# Patient Record
Sex: FEMALE | Race: WHITE | URBAN - METROPOLITAN AREA
[De-identification: names, ages, dates, MRNs, and addresses within clinical notes are randomized per-mention and may not be internally consistent; named-entity substitution may affect disease eponyms.]

---

## 2021-10-14 ENCOUNTER — OFFICE VISIT (OUTPATIENT)
Dept: PRIMARY CARE CLINIC | Age: 21
End: 2021-10-14
Payer: COMMERCIAL

## 2021-10-14 VITALS
OXYGEN SATURATION: 97 % | HEIGHT: 64 IN | BODY MASS INDEX: 27.28 KG/M2 | HEART RATE: 83 BPM | DIASTOLIC BLOOD PRESSURE: 87 MMHG | SYSTOLIC BLOOD PRESSURE: 138 MMHG | WEIGHT: 159.8 LBS

## 2021-10-14 DIAGNOSIS — Z00.00 ANNUAL PHYSICAL EXAM: Primary | ICD-10-CM

## 2021-10-14 DIAGNOSIS — J01.40 ACUTE NON-RECURRENT PANSINUSITIS: ICD-10-CM

## 2021-10-14 DIAGNOSIS — F33.9 RECURRENT MAJOR DEPRESSIVE DISORDER, REMISSION STATUS UNSPECIFIED (HCC): ICD-10-CM

## 2021-10-14 PROBLEM — F32.9 MAJOR DEPRESSIVE DISORDER, SINGLE EPISODE, UNSPECIFIED: Status: ACTIVE | Noted: 2020-07-20

## 2021-10-14 PROBLEM — E61.1 IRON DEFICIENCY: Status: ACTIVE | Noted: 2018-11-27

## 2021-10-14 PROCEDURE — 99204 OFFICE O/P NEW MOD 45 MIN: CPT | Performed by: STUDENT IN AN ORGANIZED HEALTH CARE EDUCATION/TRAINING PROGRAM

## 2021-10-14 PROCEDURE — 99385 PREV VISIT NEW AGE 18-39: CPT | Performed by: STUDENT IN AN ORGANIZED HEALTH CARE EDUCATION/TRAINING PROGRAM

## 2021-10-14 RX ORDER — AMOXICILLIN AND CLAVULANATE POTASSIUM 875; 125 MG/1; MG/1
1 TABLET, FILM COATED ORAL 2 TIMES DAILY
Qty: 14 TABLET | Refills: 0 | Status: SHIPPED | OUTPATIENT
Start: 2021-10-14 | End: 2021-10-21

## 2021-10-14 RX ORDER — BUPROPION HYDROCHLORIDE 300 MG/1
300 TABLET ORAL EVERY MORNING
Qty: 30 TABLET | Refills: 5 | Status: SHIPPED | OUTPATIENT
Start: 2021-10-14 | End: 2022-05-10 | Stop reason: SDUPTHER

## 2021-10-14 SDOH — ECONOMIC STABILITY: FOOD INSECURITY: WITHIN THE PAST 12 MONTHS, THE FOOD YOU BOUGHT JUST DIDN'T LAST AND YOU DIDN'T HAVE MONEY TO GET MORE.: NEVER TRUE

## 2021-10-14 SDOH — ECONOMIC STABILITY: FOOD INSECURITY: WITHIN THE PAST 12 MONTHS, YOU WORRIED THAT YOUR FOOD WOULD RUN OUT BEFORE YOU GOT MONEY TO BUY MORE.: NEVER TRUE

## 2021-10-14 ASSESSMENT — COLUMBIA-SUICIDE SEVERITY RATING SCALE - C-SSRS
2. HAVE YOU ACTUALLY HAD ANY THOUGHTS OF KILLING YOURSELF?: NO
6. HAVE YOU EVER DONE ANYTHING, STARTED TO DO ANYTHING, OR PREPARED TO DO ANYTHING TO END YOUR LIFE?: NO
1. WITHIN THE PAST MONTH, HAVE YOU WISHED YOU WERE DEAD OR WISHED YOU COULD GO TO SLEEP AND NOT WAKE UP?: YES

## 2021-10-14 ASSESSMENT — PATIENT HEALTH QUESTIONNAIRE - PHQ9
8. MOVING OR SPEAKING SO SLOWLY THAT OTHER PEOPLE COULD HAVE NOTICED. OR THE OPPOSITE, BEING SO FIGETY OR RESTLESS THAT YOU HAVE BEEN MOVING AROUND A LOT MORE THAN USUAL: 0
SUM OF ALL RESPONSES TO PHQ QUESTIONS 1-9: 18
3. TROUBLE FALLING OR STAYING ASLEEP: 3
2. FEELING DOWN, DEPRESSED OR HOPELESS: 3
SUM OF ALL RESPONSES TO PHQ QUESTIONS 1-9: 19
7. TROUBLE CONCENTRATING ON THINGS, SUCH AS READING THE NEWSPAPER OR WATCHING TELEVISION: 3
6. FEELING BAD ABOUT YOURSELF - OR THAT YOU ARE A FAILURE OR HAVE LET YOURSELF OR YOUR FAMILY DOWN: 3
10. IF YOU CHECKED OFF ANY PROBLEMS, HOW DIFFICULT HAVE THESE PROBLEMS MADE IT FOR YOU TO DO YOUR WORK, TAKE CARE OF THINGS AT HOME, OR GET ALONG WITH OTHER PEOPLE: 3
9. THOUGHTS THAT YOU WOULD BE BETTER OFF DEAD, OR OF HURTING YOURSELF: 1
SUM OF ALL RESPONSES TO PHQ QUESTIONS 1-9: 19
5. POOR APPETITE OR OVEREATING: 0
SUM OF ALL RESPONSES TO PHQ9 QUESTIONS 1 & 2: 6
4. FEELING TIRED OR HAVING LITTLE ENERGY: 3
1. LITTLE INTEREST OR PLEASURE IN DOING THINGS: 3

## 2021-10-14 ASSESSMENT — ENCOUNTER SYMPTOMS
RHINORRHEA: 1
NAUSEA: 0
SORE THROAT: 0
COUGH: 1
VOMITING: 0
SINUS PRESSURE: 0
SHORTNESS OF BREATH: 0
CONSTIPATION: 0
WHEEZING: 0
EYE PAIN: 0
DIARRHEA: 0

## 2021-10-14 ASSESSMENT — SOCIAL DETERMINANTS OF HEALTH (SDOH): HOW HARD IS IT FOR YOU TO PAY FOR THE VERY BASICS LIKE FOOD, HOUSING, MEDICAL CARE, AND HEATING?: NOT HARD AT ALL

## 2021-10-14 NOTE — PATIENT INSTRUCTIONS
Patient Education        Recovering From Depression: Care Instructions  Your Care Instructions     Taking good care of yourself is important as you recover from depression. In time, your symptoms will fade as your treatment takes hold. Do not give up. Instead, focus your energy on getting better. Your mood will improve. It just takes some time. Focus on things that can help you feel better, such as being with friends and family, eating well, and getting enough rest. But take things slowly. Do not do too much too soon. You will begin to feel better gradually. Follow-up care is a key part of your treatment and safety. Be sure to make and go to all appointments, and call your doctor if you are having problems. It's also a good idea to know your test results and keep a list of the medicines you take. How can you care for yourself at home? Be realistic  · If you have a large task to do, break it up into smaller steps you can handle, and just do what you can. · You may want to put off important decisions until your depression has lifted. If you have plans that will have a major impact on your life, such as marriage, divorce, or a job change, try to wait a bit. Talk it over with friends and loved ones who can help you look at the overall picture first.  · Reaching out to people for help is important. Do not isolate yourself. Let your family and friends help you. Find someone you can trust and confide in, and talk to that person. · Be patient, and be kind to yourself. Remember that depression is not your fault and is not something you can overcome with willpower alone. Treatment is important for depression, just like for any other illness. Feeling better takes time, and your mood will improve little by little. Stay active  · Stay busy and get outside. Take a walk, or try some other light exercise. · Talk with your doctor about an exercise program. Exercise can help with mild depression.   · Go to a movie or instruction, always ask your healthcare professional. [place] [address] any warranty or liability for your use of this information.

## 2021-10-14 NOTE — PROGRESS NOTES
[facility] Primary Care  Establish care visit   10/14/2021    Calista Gar (:  2000) is a 22 y.o. female, here to establish care. Chief Complaint   Patient presents with   [facility].    Annual Exam    Sinus Problem     Possible sinus infection, Head pressure and cough    Depression        ASSESSMENT/ PLAN  1. Annual physical exam  We will follow-up to complete blood work when she is feeling better. Would order lipid panel, CBC. Due for tetanus and flu vaccine. States that she did complete the HPV vaccine previously. She has been Vaccinated against Covid. 2. Acute non-recurrent pansinusitis  Start Augmentin, may need daily allergy medication if persistent  - amoxicillin-clavulanate (AUGMENTIN) 875-125 MG per tablet; Take 1 tablet by mouth 2 times daily for 7 days  Dispense: 14 tablet; Refill: 0    3. Recurrent major depressive disorder, [facility] status unspecified [facility]  Uncontrolled. Reinitiate Wellbutrin, will send in 150 mg tab if 300 mg is too much. Patient to schedule follow-up appointment with Dr. Musa Sifuentes for counseling and return to clinic in 2 weeks for medication management. Start exercise plan. Continue healthy diet. - buPROPion (WELLBUTRIN XL) 300 MG extended release tablet; Take 1 tablet by mouth every morning  Dispense: 30 tablet; Refill: 5       Return in about 2 weeks (around 10/28/2021) for follow up with Dr. Musa Sifuentes, medication management. HPI  Patient presents today to establish care. She has concerns about sinus congestion and depression. She states that she has been dealing with runny nose, sinus pressure, cough for the past 2 weeks. Covid test was negative. She is fully vaccinated. She denies fever, chills. She does not have allergies. She has not taken any medications for the symptoms. She has struggled with depression for many years. Previously well controlled on Wellbutrin.   She knows that she had tried an SSRI in the past and it did not work for her, however she cannot recall which one. She discontinued the Wellbutrin 3 months ago because she was lost to follow-up with her previous PCP. She is from [facility], and currently lives in [place] while she attends . She endorses passive SI, no active SI or plan. She would like to start counseling. Protective factors include her close relationships with her 4 siblings and her boyfriend. She endorses lack of motivation to get moving in the morning, to exercise and depressive symptoms are exacerbated by doing poorly in school. She is studying political science. She states it has been difficult to make up classwork as well in the setting of current sinusitis. She lives at home with roommates. She endorses a healthy diet and enjoys cooking. He does not currently exercise, but plans on starting walking routines. She is sleeping more than usual.  She endorses occasional alcohol use. She denies other drug use including marijuana. Family history is significant for lumpectomy in her sister; there was a concerning mass diagnosed when her sister was 25years old. Patient states that genetic testing was performed and did not show anything concerning. ROS  Review of Systems   Constitutional: Negative for activity change, appetite change, fatigue and fever. HENT: Positive for congestion and rhinorrhea. Negative for ear pain, sinus pressure and sore throat. Eyes: Negative for pain. Respiratory: Positive for cough. Negative for shortness of breath and wheezing. Cardiovascular: Negative for chest pain and leg swelling. Gastrointestinal: Negative for constipation, diarrhea, nausea and vomiting. Genitourinary: Negative for decreased urine volume and dysuria. Musculoskeletal: Negative for myalgias. Neurological: Negative for headaches. Psychiatric/Behavioral: Positive for sleep disturbance and suicidal ideas. The patient is not nervous/anxious.          HISTORIES  No current outpatient medications on file prior to visit. No current facility-administered medications on file prior to visit. No Known Allergies    Past Medical History:   Diagnosis Date    Depression        Patient Active Problem List   Diagnosis    Iron deficiency    Major depressive disorder, single episode, unspecified       History reviewed. No pertinent surgical history. Social History     Socioeconomic History    Marital status: Single     Spouse name: Not on file    Number of children: Not on file    Years of education: Not on file    Highest education level: Not on file   Occupational History    Occupation: Poly Sci student at [employer] [address] Use    Smoking status: Never Smoker    Smokeless tobacco: Never Used   Vaping Use    Vaping Use: Never used   Substance and Sexual Activity    Alcohol use: Yes     Comment: soc    Drug use: Never    Sexual activity: Yes     Partners: Male     Birth control/protection: I.U.D. Other Topics Concern    Not on file   Social History Narrative    Not on file     Social Determinants of Health     Financial Resource Strain: Low Risk     Difficulty of Paying Living Expenses: Not hard at all   Food Insecurity: No Food Insecurity    Worried About Running Out of Food in the Last Year: Never true    [address] in the Last Year: Never true   Transportation Needs:     Lack of Transportation (Medical):      Lack of Transportation (Non-Medical):    Physical Activity:     Days of Exercise per Week:     Minutes of Exercise per Session:    Stress:     Feeling of Stress :    Social Connections:     Frequency of Communication with Friends and Family:     Frequency of Social Gatherings with Friends and Family:     Attends Zoroastrian Services:     Active Member of Clubs or Organizations:     Attends Club or Organization Meetings:     Marital Status:    Intimate Partner Violence:     Fear of Current or Ex-Partner:     Emotionally Abused:     Physically Abused:     Sexually Abused:         Family History   Problem Relation Age of Onset    No Known Problems Mother     No Known Problems Father     Cancer Sister         breast lumpectomy       PE  Vitals:    10/14/21 0921   BP: 138/87   Site: Right Upper Arm   Position: Sitting   Cuff Size: Medium Adult   Pulse: 83   SpO2: 97%   Weight: 159 lb 12.8 oz (72.5 kg)   Height: 5' 4\" (1.626 m)     Estimated body mass index is 27.43 kg/m² as calculated from the following:    Height as of this encounter: 5' 4\" (1.626 m). Weight as of this encounter: 159 lb 12.8 oz (72.5 kg). Physical Exam  Vitals reviewed. Constitutional:       Appearance: Normal appearance. Comments: Tearful. HENT:      Head: Normocephalic and atraumatic. Right Ear: Tympanic membrane normal.      Left Ear: Tympanic membrane normal.      Nose: Congestion and rhinorrhea present. Comments: Sinus tenderness. Mouth/Throat:      Mouth: Mucous membranes are moist.      Pharynx: Oropharynx is clear. Eyes:      Conjunctiva/sclera: Conjunctivae normal.      Pupils: Pupils are equal, round, and reactive to light. Cardiovascular:      Rate and Rhythm: Normal rate and regular rhythm. Pulses: Normal pulses. Heart sounds: Normal heart sounds. Pulmonary:      Effort: Pulmonary effort is normal.      Breath sounds: Normal breath sounds. Abdominal:      General: Abdomen is flat. Palpations: Abdomen is soft. Musculoskeletal:         General: Normal range of motion. Cervical back: Normal range of motion and neck supple. Skin:     General: Skin is warm. Neurological:      Mental Status: She is alert.    Psychiatric:         Mood and Affect: Mood normal.         Behavior: Behavior normal.         Immunization History   Administered Date(s) Administered    Influenza Virus Vaccine 08/31/2012       Health Maintenance   Topic Date Due    Hepatitis C screen  Never done    Varicella vaccine (1 of 2 - 2-dose childhood series) Never done    HPV vaccine (1 - 2-dose series) Never done    COVID-19 Vaccine (1) Never done    HIV screen  Never done    Chlamydia screen  Never done    DTaP/Tdap/Td vaccine (1 - Tdap) Never done    Flu vaccine (1) 09/01/2021    Hepatitis A vaccine  Aged Out    Hepatitis B vaccine  Aged Out    Hib vaccine  Aged Out    Meningococcal (ACWY) vaccine  Aged Out    Pneumococcal 0-64 years Vaccine  Aged Out       PSH, PMH, SH and FH reviewed and noted. Recent and past labs, tests and consults also reviewed. Recent or new meds also reviewed. Akira Dowling,     This dictation was generated by voice recognition computer software. Although all attempts are made to edit the dictation for accuracy, there may be errors in the transcription that are not intended.

## 2021-11-04 ENCOUNTER — TELEPHONE (OUTPATIENT)
Dept: PRIMARY CARE CLINIC | Age: 21
End: 2021-11-04

## 2022-01-11 ENCOUNTER — OFFICE VISIT (OUTPATIENT)
Dept: PRIMARY CARE CLINIC | Age: 22
End: 2022-01-11

## 2022-01-11 VITALS
HEIGHT: 64 IN | HEART RATE: 91 BPM | BODY MASS INDEX: 24.14 KG/M2 | DIASTOLIC BLOOD PRESSURE: 76 MMHG | SYSTOLIC BLOOD PRESSURE: 125 MMHG | WEIGHT: 141.4 LBS

## 2022-01-11 DIAGNOSIS — D50.9 IRON DEFICIENCY ANEMIA, UNSPECIFIED IRON DEFICIENCY ANEMIA TYPE: ICD-10-CM

## 2022-01-11 DIAGNOSIS — B37.9 ANTIBIOTIC-INDUCED YEAST INFECTION: ICD-10-CM

## 2022-01-11 DIAGNOSIS — F32.4 MAJOR DEPRESSIVE DISORDER WITH SINGLE EPISODE, IN PARTIAL REMISSION (HCC): ICD-10-CM

## 2022-01-11 DIAGNOSIS — L29.9 ITCHING: Primary | ICD-10-CM

## 2022-01-11 DIAGNOSIS — Z76.89 ENCOUNTER FOR ASSESSMENT OF STD EXPOSURE: ICD-10-CM

## 2022-01-11 DIAGNOSIS — N30.01 ACUTE CYSTITIS WITH HEMATURIA: ICD-10-CM

## 2022-01-11 DIAGNOSIS — T36.95XA ANTIBIOTIC-INDUCED YEAST INFECTION: ICD-10-CM

## 2022-01-11 DIAGNOSIS — L29.9 ITCHING: ICD-10-CM

## 2022-01-11 LAB
A/G RATIO: 2 (ref 1.1–2.2)
ALBUMIN SERPL-MCNC: 4.7 G/DL (ref 3.4–5)
ALP BLD-CCNC: 83 U/L (ref 40–129)
ALT SERPL-CCNC: 7 U/L (ref 10–40)
ANION GAP SERPL CALCULATED.3IONS-SCNC: 14 MMOL/L (ref 3–16)
AST SERPL-CCNC: 13 U/L (ref 15–37)
BASOPHILS ABSOLUTE: 0 K/UL (ref 0–0.2)
BASOPHILS RELATIVE PERCENT: 0.6 %
BILIRUB SERPL-MCNC: 0.8 MG/DL (ref 0–1)
BILIRUBIN, POC: NEGATIVE
BLOOD URINE, POC: ABNORMAL
BUN BLDV-MCNC: 8 MG/DL (ref 7–20)
CALCIUM SERPL-MCNC: 9.9 MG/DL (ref 8.3–10.6)
CHLORIDE BLD-SCNC: 97 MMOL/L (ref 99–110)
CLARITY, POC: CLEAR
CO2: 25 MMOL/L (ref 21–32)
COLOR, POC: YELLOW
CREAT SERPL-MCNC: 0.7 MG/DL (ref 0.6–1.1)
EOSINOPHILS ABSOLUTE: 0.1 K/UL (ref 0–0.6)
EOSINOPHILS RELATIVE PERCENT: 1.6 %
FERRITIN: 59.1 NG/ML (ref 15–150)
GFR AFRICAN AMERICAN: >60
GFR NON-AFRICAN AMERICAN: >60
GLUCOSE BLD-MCNC: 78 MG/DL (ref 70–99)
GLUCOSE URINE, POC: NEGATIVE
HCT VFR BLD CALC: 43.9 % (ref 36–48)
HEMOGLOBIN: 13.9 G/DL (ref 12–16)
HEPATITIS C ANTIBODY INTERPRETATION: NORMAL
IRON % SATURATION: 45 % (ref 15–50)
IRON: 128 UG/DL (ref 37–145)
KETONES, POC: NEGATIVE
LEUKOCYTE EST, POC: ABNORMAL
LYMPHOCYTES ABSOLUTE: 1.5 K/UL (ref 1–5.1)
LYMPHOCYTES RELATIVE PERCENT: 21.3 %
MCH RBC QN AUTO: 27 PG (ref 26–34)
MCHC RBC AUTO-ENTMCNC: 31.7 G/DL (ref 31–36)
MCV RBC AUTO: 85.2 FL (ref 80–100)
MONOCYTES ABSOLUTE: 0.7 K/UL (ref 0–1.3)
MONOCYTES RELATIVE PERCENT: 9.5 %
NEUTROPHILS ABSOLUTE: 4.8 K/UL (ref 1.7–7.7)
NEUTROPHILS RELATIVE PERCENT: 67 %
NITRITE, POC: NEGATIVE
PDW BLD-RTO: 15.3 % (ref 12.4–15.4)
PH, POC: 6
PLATELET # BLD: 326 K/UL (ref 135–450)
PMV BLD AUTO: 7.7 FL (ref 5–10.5)
POTASSIUM SERPL-SCNC: 4.6 MMOL/L (ref 3.5–5.1)
PROTEIN, POC: NEGATIVE
RBC # BLD: 5.15 M/UL (ref 4–5.2)
SODIUM BLD-SCNC: 136 MMOL/L (ref 136–145)
SPECIFIC GRAVITY, POC: 1.02
SPECIMEN TYPE: NORMAL
TOTAL IRON BINDING CAPACITY: 284 UG/DL (ref 260–445)
TOTAL PROTEIN: 7.1 G/DL (ref 6.4–8.2)
TRANSFERRIN: 248 MG/DL (ref 200–360)
TRICHOMONAS VAGINALIS SCREEN: NEGATIVE
UROBILINOGEN, POC: 0.2
WBC # BLD: 7.1 K/UL (ref 4–11)

## 2022-01-11 RX ORDER — FLUCONAZOLE 150 MG/1
150 TABLET ORAL ONCE
Qty: 1 TABLET | Refills: 0 | Status: SHIPPED | OUTPATIENT
Start: 2022-01-11 | End: 2022-01-11

## 2022-01-11 RX ORDER — NITROFURANTOIN 25; 75 MG/1; MG/1
100 CAPSULE ORAL 2 TIMES DAILY
Qty: 10 CAPSULE | Refills: 0 | Status: SHIPPED | OUTPATIENT
Start: 2022-01-11 | End: 2022-01-16

## 2022-01-11 ASSESSMENT — ENCOUNTER SYMPTOMS
DIARRHEA: 0
COUGH: 0
CONSTIPATION: 0
SHORTNESS OF BREATH: 0

## 2022-01-11 NOTE — PATIENT INSTRUCTIONS
Patient Education        Iron Deficiency Anemia: Care Instructions  Your Care Instructions     Anemia means that you don't have enough red blood cells. Red blood cells carry oxygen around your body. When you have anemia, it can make you pale, weak, and tired. Many things can cause anemia. The most common cause is loss of blood. This can happen if you have heavy menstrual periods. It can also happen if you have bleeding in your stomach or bowel. You can also get anemia if you don't have enough iron in your diet or if it's hard for your body to absorb iron. In some cases, pregnancy causes anemia. That's because a pregnant woman needs more iron. Your doctor may do more tests to find the cause of your anemia. If a disease or other health problem is causing it, your doctor will treat that problem. It's important to follow up with your doctor to make sure that your iron level returns to normal.  Follow-up care is a key part of your treatment and safety. Be sure to make and go to all appointments, and call your doctor if you are having problems. It's also a good idea to know your test results and keep a list of the medicines you take. How can you care for yourself at home? · If your doctor recommended iron pills, take them as directed. ? Try to take the pills on an empty stomach. You can do this about 1 hour before or 2 hours after meals. But you may need to take iron with food to avoid an upset stomach. ? Do not take antacids or drink milk or anything with caffeine within 2 hours of when you take your iron. They can keep your body from absorbing the iron well. ? Vitamin C helps your body absorb iron. You may want to take iron pills with a glass of orange juice or some other food high in vitamin C.  ? Iron pills may cause stomach problems. These include heartburn, nausea, diarrhea, constipation, and cramps. It can help to drink plenty of fluids and include fruits, vegetables, and fiber in your diet.   ? It's normal for iron pills to make your stool a greenish or grayish black. But internal bleeding can also cause dark stool. So it's important to tell your doctor about any color changes. ? Call your doctor if you think you are having a problem with your iron pills. Even after you start to feel better, it will take several months for your body to build up its supply of iron. ? If you miss a pill, don't take a double dose. ? Keep iron pills out of the reach of small children. Too much iron can be very dangerous. · Eat foods with a lot of iron. These include red meat, shellfish, poultry, and eggs. They also include beans, raisins, whole-grain bread, and leafy green vegetables. · Steam your vegetables. This is the best way to prepare them if you want to get as much iron as possible. · Be safe with medicines. Do not take nonsteroidal anti-inflammatory pain relievers unless your doctor tells you to. These include aspirin, naproxen (Aleve), and ibuprofen (Advil, Motrin). · Liquid iron can stain your teeth. But you can mix it with water or juice and drink it with a straw. Then it won't get on your teeth. When should you call for help? Call 911 anytime you think you may need emergency care. For example, call if:    · You passed out (lost consciousness). Call your doctor now or seek immediate medical care if:    · You are short of breath.     · You are dizzy or light-headed, or you feel like you may faint.     · You have new or worse bleeding. Watch closely for changes in your health, and be sure to contact your doctor if:    · You feel weaker or more tired than usual.     · You do not get better as expected. Where can you learn more? Go to [email or web address] org and sign in to your [employer] account. Enter O570 in the [employer] box to learn more about \"Iron Deficiency Anemia: Care Instructions. \"     If you do not have an account, please click on the \"Sign Up Now\" link.   Current as of: April 29, 2021               Content Version: 13.1  © 8198-1994 Healthwise, Incorporated. Care instructions adapted under license by [facility] ([facility]). If you have questions about a medical condition or this instruction, always ask your healthcare professional. [address] any warranty or liability for your use of this information.

## 2022-01-11 NOTE — PROGRESS NOTES
[facility] Primary Care  2022    Logan Masters (:  2000) is a 22 y.o. female, here for evaluation of the following medical concerns:    Chief Complaint   Patient presents with   [facility] Other     pt thinks she has a uti has had symptoms since last thursday also wants to get lab work done        ASSESSMENT/ PLAN  1. Itching  2. Acute cystitis with hematuria  Uncontrolled, this is a new problem. POC UA with small leuks, trace blood. Will send for culture. Will empirically treat for UTI and yeast infection. STD testing ordered  - POCT ACT-C  - Urinalysis Reflex to Culture; Future  - nitrofurantoin, macrocrystal-monohydrate, (MACROBID) 100 MG capsule; Take 1 capsule by mouth 2 times daily for 5 days  Dispense: 10 capsule; Refill: 0  - POCT Urinalysis no Micro    3. Iron deficiency anemia, unspecified iron deficiency anemia type  Recheck CBC and iron studies  - Comprehensive Metabolic Panel; Future  - CBC Auto Differential; Future  - Iron and TIBC; Future  - Transferrin; Future  - Ferritin; Future    4. Encounter for assessment of STD exposure  Symptomatic with vaginal discharge, dysuria.  - C.trachomatis N.gonorrhoeae DNA, Urine; Future  - Hepatitis C Antibody; Future  - HIV Screen; Future  - Trichomonas by EIA; Future  - RPR Reflex; Future    5. Yeast infection  Uncontrolled, this is a new problem. We will empirically treat with Diflucan  - fluconazole (DIFLUCAN) 150 MG tablet; Take 1 tablet by mouth once for 1 dose  Dispense: 1 tablet; Refill: 0    6. Major depressive disorder in partial remission  Uncontrolled, although improving. Continue Wellbutrin and journaling. Consider psychotherapy     Follow-up if urinary symptoms persistent or worsening in 1 week. Return in about 6 months (around 2022) for mood recheck. HPI  Patient presents today with concerns of dysuria, vaginal discharge and anemia. For 5 days, she endorses dysuria, vaginal itching and hematuria.   She states that she took an Azo over-the-counter UTI test and Pyridium. The UTI test was positive, and her symptoms have been improving with Pyridium. She states that she gets about 3 UTIs per year. She has never had a yeast infection. She does endorse chunky white discharge. She would like STD testing. No flank pain, suprapubic pain, fever, chills. No history of nephrolithiasis. She has a history of iron deficiency anemia, and would like blood work completed today. No lightheadedness, fatigue, shortness of breath or palpitations. She is not currently taking iron supplementation. She endorses a better mood since initiating Wellbutrin 300 mg. She currently is not in counseling, and would like to defer on starting for now. She states that she has been journaling, which has been helpful for her mood. She started her second semester of school yesterday. ROS  Review of Systems   Constitutional: Negative for fatigue and fever. HENT: Negative for congestion. Respiratory: Negative for cough and shortness of breath. Cardiovascular: Negative for leg swelling. Gastrointestinal: Negative for constipation and diarrhea. Genitourinary: Positive for dysuria, frequency, hematuria, urgency and vaginal discharge. Negative for flank pain, genital sores and vaginal bleeding. Neurological: Negative for headaches. Psychiatric/Behavioral: Negative for sleep disturbance. The patient is not nervous/anxious. HISTORIES  Current Outpatient Medications on File Prior to Visit   Medication Sig Dispense Refill    buPROPion (WELLBUTRIN XL) 300 MG extended release tablet Take 1 tablet by mouth every morning 30 tablet 5     No current facility-administered medications on file prior to visit.       Past Medical History:   Diagnosis Date    Depression      Patient Active Problem List   Diagnosis    Iron deficiency    Major depressive disorder, single episode, unspecified       PE  Vitals:    01/11/22 1107   BP: 125/76   Site: Left Upper Arm   Position: Sitting   Cuff Size: Large Adult   Pulse: 91   Weight: 141 lb 6.4 oz (64.1 kg)   Height: 5' 4\" (1.626 m)     Estimated body mass index is 24.27 kg/m² as calculated from the following:    Height as of this encounter: 5' 4\" (1.626 m). Weight as of this encounter: 141 lb 6.4 oz (64.1 kg). Physical Exam  Vitals reviewed. Constitutional:       Appearance: Normal appearance. HENT:      Head: Normocephalic and atraumatic. Right Ear: Tympanic membrane normal.      Left Ear: Tympanic membrane normal.      Nose: Nose normal.      Mouth/Throat:      Mouth: Mucous membranes are moist.      Pharynx: Oropharynx is clear. Eyes:      Conjunctiva/sclera: Conjunctivae normal.      Pupils: Pupils are equal, round, and reactive to light. Cardiovascular:      Rate and Rhythm: Normal rate and regular rhythm. Pulses: Normal pulses. Heart sounds: Normal heart sounds. Pulmonary:      Effort: Pulmonary effort is normal.      Breath sounds: Normal breath sounds. Abdominal:      General: Abdomen is flat. Bowel sounds are normal.      Palpations: Abdomen is soft. Tenderness: There is no abdominal tenderness. There is no right CVA tenderness or left CVA tenderness. Musculoskeletal:         General: Normal range of motion. Cervical back: Normal range of motion and neck supple. Skin:     General: Skin is warm. Neurological:      Mental Status: She is alert. Psychiatric:         Mood and Affect: Mood normal.         Behavior: Behavior normal.         Alexsandra Wu, DO    This dictation was generated by voice recognition computer software. Although all attempts are made to edit the dictation for accuracy, there may be errors in the transcription that are not intended.

## 2022-01-12 LAB
C. TRACHOMATIS DNA ,URINE: NEGATIVE
HIV AG/AB: NORMAL
HIV ANTIGEN: NORMAL
HIV-1 ANTIBODY: NORMAL
HIV-2 AB: NORMAL
N. GONORRHOEAE DNA, URINE: NEGATIVE
TOTAL SYPHILLIS IGG/IGM: NORMAL

## 2022-01-14 DIAGNOSIS — N30.01 ACUTE CYSTITIS WITH HEMATURIA: Primary | ICD-10-CM

## 2022-01-14 LAB
ORGANISM: ABNORMAL
URINE CULTURE, ROUTINE: ABNORMAL

## 2022-01-14 RX ORDER — SULFAMETHOXAZOLE AND TRIMETHOPRIM 800; 160 MG/1; MG/1
1 TABLET ORAL 2 TIMES DAILY
Qty: 6 TABLET | Refills: 0 | Status: SHIPPED | OUTPATIENT
Start: 2022-01-14 | End: 2022-01-17

## 2022-01-21 ENCOUNTER — TELEPHONE (OUTPATIENT)
Dept: PRIMARY CARE CLINIC | Age: 22
End: 2022-01-21

## 2022-01-24 NOTE — TELEPHONE ENCOUNTER
Letter of approval sent through my chart -please notify patient. If she needs this printed and signed, please let me know and I will leave at the . Thanks!

## 2022-05-10 DIAGNOSIS — F33.9 RECURRENT MAJOR DEPRESSIVE DISORDER, REMISSION STATUS UNSPECIFIED (HCC): ICD-10-CM

## 2022-05-10 RX ORDER — BUPROPION HYDROCHLORIDE 300 MG/1
300 TABLET ORAL EVERY MORNING
Qty: 30 TABLET | Refills: 5 | Status: SHIPPED | OUTPATIENT
Start: 2022-05-10 | End: 2022-06-06 | Stop reason: SDUPTHER

## 2022-05-10 NOTE — TELEPHONE ENCOUNTER
Medication:   Requested Prescriptions     Pending Prescriptions Disp Refills    buPROPion (WELLBUTRIN XL) 300 MG extended release tablet 30 tablet 5     Sig: Take 1 tablet by mouth every morning        Last Filled:      Patient Phone Number: [phone] (home)     Last appt: 1/11/2022   Next appt: Visit date not found    Last OARRS: No flowsheet data found.

## 2022-06-06 DIAGNOSIS — F33.9 RECURRENT MAJOR DEPRESSIVE DISORDER, REMISSION STATUS UNSPECIFIED (HCC): ICD-10-CM

## 2022-06-06 RX ORDER — BUPROPION HYDROCHLORIDE 300 MG/1
300 TABLET ORAL EVERY MORNING
Qty: 90 TABLET | Refills: 1 | Status: SHIPPED | OUTPATIENT
Start: 2022-06-06

## 2022-06-06 NOTE — TELEPHONE ENCOUNTER
Pt needs 90 day supply for insurance to cover  buPROPion (WELLBUTRIN XL) 300 MG extended release tablet     [facility]Y #[id number]- [place], [place] -  [address] Kina Oliver [phone] - F [phone]

## 2023-01-09 DIAGNOSIS — F33.9 RECURRENT MAJOR DEPRESSIVE DISORDER, REMISSION STATUS UNSPECIFIED (HCC): ICD-10-CM

## 2023-01-09 RX ORDER — BUPROPION HYDROCHLORIDE 300 MG/1
TABLET ORAL
Qty: 90 TABLET | Refills: 1 | Status: SHIPPED | OUTPATIENT
Start: 2023-01-09

## 2023-01-09 NOTE — TELEPHONE ENCOUNTER
Medication:   Requested Prescriptions     Pending Prescriptions Disp Refills    buPROPion (WELLBUTRIN XL) 300 MG extended release tablet [Pharmacy Med Name: BUPROPION HCL  MG TABLET] 90 tablet 1     Sig: TAKE 1 TABLET BY MOUTH EVERY DAY IN THE MORNING        Last Filled:  6/6/2022     Patient Phone Number: [phone] (home)     Last appt: 1/11/2022   Next appt: Visit date not found    Last OARRS: No flowsheet data found. Return in about 6 months (around 7/11/2022) for mood recheck.

## 2023-01-16 ENCOUNTER — OFFICE VISIT (OUTPATIENT)
Dept: PRIMARY CARE CLINIC | Age: 23
End: 2023-01-16
Payer: COMMERCIAL

## 2023-01-16 VITALS
WEIGHT: 141.2 LBS | DIASTOLIC BLOOD PRESSURE: 72 MMHG | SYSTOLIC BLOOD PRESSURE: 127 MMHG | BODY MASS INDEX: 24.11 KG/M2 | HEART RATE: 74 BPM | HEIGHT: 64 IN

## 2023-01-16 DIAGNOSIS — F33.9 RECURRENT MAJOR DEPRESSIVE DISORDER, REMISSION STATUS UNSPECIFIED (HCC): Primary | ICD-10-CM

## 2023-01-16 DIAGNOSIS — R10.11 RUQ ABDOMINAL PAIN: ICD-10-CM

## 2023-01-16 PROCEDURE — 99214 OFFICE O/P EST MOD 30 MIN: CPT | Performed by: FAMILY MEDICINE

## 2023-01-16 PROCEDURE — G8420 CALC BMI NORM PARAMETERS: HCPCS | Performed by: FAMILY MEDICINE

## 2023-01-16 PROCEDURE — G8484 FLU IMMUNIZE NO ADMIN: HCPCS | Performed by: FAMILY MEDICINE

## 2023-01-16 PROCEDURE — 1036F TOBACCO NON-USER: CPT | Performed by: FAMILY MEDICINE

## 2023-01-16 PROCEDURE — G8427 DOCREV CUR MEDS BY ELIG CLIN: HCPCS | Performed by: FAMILY MEDICINE

## 2023-01-16 PROCEDURE — 96127 BRIEF EMOTIONAL/BEHAV ASSMT: CPT | Performed by: FAMILY MEDICINE

## 2023-01-16 RX ORDER — FLUOXETINE 10 MG/1
10 CAPSULE ORAL DAILY
Qty: 30 CAPSULE | Refills: 3 | Status: SHIPPED | OUTPATIENT
Start: 2023-01-16

## 2023-01-16 ASSESSMENT — ANXIETY QUESTIONNAIRES
6. BECOMING EASILY ANNOYED OR IRRITABLE: 1
4. TROUBLE RELAXING: 3
3. WORRYING TOO MUCH ABOUT DIFFERENT THINGS: 3
7. FEELING AFRAID AS IF SOMETHING AWFUL MIGHT HAPPEN: 1
1. FEELING NERVOUS, ANXIOUS, OR ON EDGE: 3
2. NOT BEING ABLE TO STOP OR CONTROL WORRYING: 3
5. BEING SO RESTLESS THAT IT IS HARD TO SIT STILL: 3
GAD7 TOTAL SCORE: 17

## 2023-01-16 ASSESSMENT — PATIENT HEALTH QUESTIONNAIRE - PHQ9
SUM OF ALL RESPONSES TO PHQ QUESTIONS 1-9: 18
2. FEELING DOWN, DEPRESSED OR HOPELESS: 3
4. FEELING TIRED OR HAVING LITTLE ENERGY: 3
7. TROUBLE CONCENTRATING ON THINGS, SUCH AS READING THE NEWSPAPER OR WATCHING TELEVISION: 1
1. LITTLE INTEREST OR PLEASURE IN DOING THINGS: 2
SUM OF ALL RESPONSES TO PHQ QUESTIONS 1-9: 18
6. FEELING BAD ABOUT YOURSELF - OR THAT YOU ARE A FAILURE OR HAVE LET YOURSELF OR YOUR FAMILY DOWN: 2
SUM OF ALL RESPONSES TO PHQ QUESTIONS 1-9: 18
3. TROUBLE FALLING OR STAYING ASLEEP: 3
SUM OF ALL RESPONSES TO PHQ9 QUESTIONS 1 & 2: 5
9. THOUGHTS THAT YOU WOULD BE BETTER OFF DEAD, OR OF HURTING YOURSELF: 0
8. MOVING OR SPEAKING SO SLOWLY THAT OTHER PEOPLE COULD HAVE NOTICED. OR THE OPPOSITE, BEING SO FIGETY OR RESTLESS THAT YOU HAVE BEEN MOVING AROUND A LOT MORE THAN USUAL: 1
SUM OF ALL RESPONSES TO PHQ QUESTIONS 1-9: 18
5. POOR APPETITE OR OVEREATING: 3

## 2023-01-16 NOTE — PROGRESS NOTES
Chief Complaint   Patient presents with    Anxiety    Abdominal Pain       HPI: Rosie Gifford  presents for evaluation and management of pression, anxiety, and right upper quadrant abdominal pain    Oma Caro has a history of anxiety and depression. She notes that she had counseling previously when her parents were getting  which she did not enjoy. She states the Wellbutrin really helps her depression and anxiety but currently not doing well and would like add-on therapy if possible. She also notes she has been having some intermittent right upper quadrant abdominal pain. She is not sure if it is associated with eating. She thought she might have a UTI and was treated for UTI at an acute care clinic recently which did help but patient notes that she is still having some right-sided upper abdominal pain. Today's PHQ:    PHQ Scores 1/16/2023 10/14/2021   PHQ2 Score 5 6   PHQ9 Score 18 19     Interpretation of Total Score Depression Severity: 1-4 = Minimal depression, 5-9 = Mild depression, 10-14 = Moderate depression, 15-19 = Moderately severe depression, 20-27 = Severe depression        Review of Systems    No Known Allergies  New Prescriptions    No medications on file     Current Outpatient Medications   Medication Sig Dispense Refill    buPROPion (WELLBUTRIN XL) 300 MG extended release tablet TAKE 1 TABLET BY MOUTH EVERY DAY IN THE MORNING 90 tablet 1     No current facility-administered medications for this visit. Past Medical History:   Diagnosis Date    Depression          Objective   /72   Pulse 74   Ht 5' 4\" (1.626 m)   Wt 141 lb 3.2 oz (64 kg)   BMI 24.24 kg/m²   Wt Readings from Last 3 Encounters:   01/16/23 141 lb 3.2 oz (64 kg)   01/11/22 141 lb 6.4 oz (64.1 kg)   10/14/21 159 lb 12.8 oz (72.5 kg)       Physical Exam  Constitutional:       Appearance: She is well-developed. Cardiovascular:      Rate and Rhythm: Normal rate and regular rhythm.       Pulses:           Dorsalis pedis pulses are 2+ on the right side and 2+ on the left side. Posterior tibial pulses are 2+ on the right side and 2+ on the left side. Heart sounds: No murmur heard. No friction rub. No gallop. Comments: No edema lower extremities  Pulmonary:      Effort: Pulmonary effort is normal.      Breath sounds: Normal breath sounds. No wheezing or rales. Abdominal:      General: Bowel sounds are normal. There is no distension. Palpations: Abdomen is soft. There is no mass. Tenderness: There is no abdominal tenderness. There is no right CVA tenderness, left CVA tenderness, guarding or rebound. Skin:     General: Skin is warm and dry. Findings: No rash. Chemistry        Component Value Date/Time     01/11/2022 1200    K 4.6 01/11/2022 1200    CL 97 (L) 01/11/2022 1200    CO2 25 01/11/2022 1200    BUN 8 01/11/2022 1200    CREATININE 0.7 01/11/2022 1200        Component Value Date/Time    CALCIUM 9.9 01/11/2022 1200    ALKPHOS 83 01/11/2022 1200    AST 13 (L) 01/11/2022 1200    ALT 7 (L) 01/11/2022 1200    BILITOT 0.8 01/11/2022 1200          Lab Results   Component Value Date    WBC 7.1 01/11/2022    HGB 13.9 01/11/2022    HCT 43.9 01/11/2022    MCV 85.2 01/11/2022     01/11/2022     No results found for: LABA1C  No results found for: EAG  No results found for: LABA1C  No components found for: CHLPL  No results found for: TRIG  No results found for: HDL  No results found for: LDLCALC  No results found for: LABVLDL      Assessment   Plan   1. Recurrent major depressive disorder, remission status unspecified ([facility] [address]  Uncontrolled: Counseled patient to check with her provider network for 3 psychologist she can see and let us know if she needs a referral and schedule first available follow-up as counseling works best with medication.   In the meantime we will add Prozac to her Wellbutrin and follow-up in 6 weeks  - NV BEHAV ASSMT W/SCORE & DOCD/STAND INSTRUMENT  - FLUoxetine (PROZAC) 10 MG capsule; Take 1 capsule by mouth daily  Dispense: 30 capsule; Refill: 3    2. RUQ abdominal pain  Patient's clinical exam today was benign. She has no costovertebral angle tenderness or right upper quadrant tenderness. I counseled her to monitor for recurrence of symptoms and note if any associations were present with diet or activity. Follow-up if returns or worse        RTC Return in about 6 weeks (around 2/27/2023).

## 2023-01-16 NOTE — PATIENT INSTRUCTIONS
Check for 3 psychologists in your network to get some counseling and set up an appointment.   Let us know if you need a referral.

## 2023-01-31 ENCOUNTER — PATIENT MESSAGE (OUTPATIENT)
Dept: PRIMARY CARE CLINIC | Age: 23
End: 2023-01-31

## 2023-01-31 NOTE — TELEPHONE ENCOUNTER
From: Angeles Hansen  To: Dr. Chantelle Gray: 1/31/2023 10:56 AM EST  Subject: New medication side effect    My new medication has been causing very severe stomach pains that feel debilitating.  They have been so severe i dont know if i want to continue on this medicine or wait it out

## 2023-02-03 ENCOUNTER — TELEPHONE (OUTPATIENT)
Dept: PRIMARY CARE CLINIC | Age: 23
End: 2023-02-03

## 2023-02-03 NOTE — TELEPHONE ENCOUNTER
[facility] to request   90 day supply    buPROPion (WELLBUTRIN XL) 300 MG extended release tablet   Be sent in

## 2023-02-06 NOTE — TELEPHONE ENCOUNTER
Called pt to confirm this is the pharmacy she is using no answer lmom.  Letting her know this will not be done until she give us a call back stating she uses this pharmacy

## 2023-03-13 DIAGNOSIS — F33.9 RECURRENT MAJOR DEPRESSIVE DISORDER, REMISSION STATUS UNSPECIFIED (HCC): ICD-10-CM

## 2023-03-13 RX ORDER — FLUOXETINE 10 MG/1
10 CAPSULE ORAL DAILY
Qty: 30 CAPSULE | Refills: 0 | Status: SHIPPED | OUTPATIENT
Start: 2023-03-13

## 2023-03-13 NOTE — PROGRESS NOTES
Medication:   Requested Prescriptions     Pending Prescriptions Disp Refills    FLUoxetine (PROZAC) 10 MG capsule 30 capsule 3     Sig: Take 1 capsule by mouth daily        Last Filled:  01/16/23    Patient Phone Number: [phone] (home)     Last appt: 1/16/2023 Return in about 6 weeks (around 2/27/2023). Next appt: Visit date not found    Last OARRS: No flowsheet data found.

## 2023-03-20 ENCOUNTER — OFFICE VISIT (OUTPATIENT)
Dept: PRIMARY CARE CLINIC | Age: 23
End: 2023-03-20
Payer: COMMERCIAL

## 2023-03-20 VITALS
BODY MASS INDEX: 24.04 KG/M2 | HEART RATE: 92 BPM | TEMPERATURE: 97 F | HEIGHT: 64 IN | DIASTOLIC BLOOD PRESSURE: 68 MMHG | WEIGHT: 140.8 LBS | SYSTOLIC BLOOD PRESSURE: 118 MMHG

## 2023-03-20 DIAGNOSIS — F41.1 GAD (GENERALIZED ANXIETY DISORDER): ICD-10-CM

## 2023-03-20 DIAGNOSIS — F33.9 RECURRENT MAJOR DEPRESSIVE DISORDER, REMISSION STATUS UNSPECIFIED (HCC): Primary | ICD-10-CM

## 2023-03-20 PROCEDURE — 1036F TOBACCO NON-USER: CPT | Performed by: STUDENT IN AN ORGANIZED HEALTH CARE EDUCATION/TRAINING PROGRAM

## 2023-03-20 PROCEDURE — G8427 DOCREV CUR MEDS BY ELIG CLIN: HCPCS | Performed by: STUDENT IN AN ORGANIZED HEALTH CARE EDUCATION/TRAINING PROGRAM

## 2023-03-20 PROCEDURE — 99214 OFFICE O/P EST MOD 30 MIN: CPT | Performed by: STUDENT IN AN ORGANIZED HEALTH CARE EDUCATION/TRAINING PROGRAM

## 2023-03-20 PROCEDURE — G8420 CALC BMI NORM PARAMETERS: HCPCS | Performed by: STUDENT IN AN ORGANIZED HEALTH CARE EDUCATION/TRAINING PROGRAM

## 2023-03-20 PROCEDURE — G8484 FLU IMMUNIZE NO ADMIN: HCPCS | Performed by: STUDENT IN AN ORGANIZED HEALTH CARE EDUCATION/TRAINING PROGRAM

## 2023-03-20 RX ORDER — VENLAFAXINE HYDROCHLORIDE 37.5 MG/1
37.5 CAPSULE, EXTENDED RELEASE ORAL DAILY
Qty: 30 CAPSULE | Refills: 0 | Status: SHIPPED | OUTPATIENT
Start: 2023-03-20

## 2023-03-20 SDOH — ECONOMIC STABILITY: INCOME INSECURITY: HOW HARD IS IT FOR YOU TO PAY FOR THE VERY BASICS LIKE FOOD, HOUSING, MEDICAL CARE, AND HEATING?: NOT HARD AT ALL

## 2023-03-20 SDOH — ECONOMIC STABILITY: FOOD INSECURITY: WITHIN THE PAST 12 MONTHS, YOU WORRIED THAT YOUR FOOD WOULD RUN OUT BEFORE YOU GOT MONEY TO BUY MORE.: NEVER TRUE

## 2023-03-20 SDOH — ECONOMIC STABILITY: FOOD INSECURITY: WITHIN THE PAST 12 MONTHS, THE FOOD YOU BOUGHT JUST DIDN'T LAST AND YOU DIDN'T HAVE MONEY TO GET MORE.: NEVER TRUE

## 2023-03-20 SDOH — ECONOMIC STABILITY: HOUSING INSECURITY
IN THE LAST 12 MONTHS, WAS THERE A TIME WHEN YOU DID NOT HAVE A STEADY PLACE TO SLEEP OR SLEPT IN A SHELTER (INCLUDING NOW)?: NO

## 2023-03-20 ASSESSMENT — ENCOUNTER SYMPTOMS
ABDOMINAL PAIN: 0
CONSTIPATION: 0
SHORTNESS OF BREATH: 0
CHEST TIGHTNESS: 0
NAUSEA: 0

## 2023-03-20 ASSESSMENT — PATIENT HEALTH QUESTIONNAIRE - PHQ9
SUM OF ALL RESPONSES TO PHQ9 QUESTIONS 1 & 2: 5
5. POOR APPETITE OR OVEREATING: 2
SUM OF ALL RESPONSES TO PHQ QUESTIONS 1-9: 18
3. TROUBLE FALLING OR STAYING ASLEEP: 3
SUM OF ALL RESPONSES TO PHQ QUESTIONS 1-9: 16
6. FEELING BAD ABOUT YOURSELF - OR THAT YOU ARE A FAILURE OR HAVE LET YOURSELF OR YOUR FAMILY DOWN: 3
SUM OF ALL RESPONSES TO PHQ QUESTIONS 1-9: 18
8. MOVING OR SPEAKING SO SLOWLY THAT OTHER PEOPLE COULD HAVE NOTICED. OR THE OPPOSITE, BEING SO FIGETY OR RESTLESS THAT YOU HAVE BEEN MOVING AROUND A LOT MORE THAN USUAL: 0
4. FEELING TIRED OR HAVING LITTLE ENERGY: 3
SUM OF ALL RESPONSES TO PHQ QUESTIONS 1-9: 18
9. THOUGHTS THAT YOU WOULD BE BETTER OFF DEAD, OR OF HURTING YOURSELF: 2
7. TROUBLE CONCENTRATING ON THINGS, SUCH AS READING THE NEWSPAPER OR WATCHING TELEVISION: 0
1. LITTLE INTEREST OR PLEASURE IN DOING THINGS: 2
2. FEELING DOWN, DEPRESSED OR HOPELESS: 3

## 2023-03-20 ASSESSMENT — ANXIETY QUESTIONNAIRES
7. FEELING AFRAID AS IF SOMETHING AWFUL MIGHT HAPPEN: 0
5. BEING SO RESTLESS THAT IT IS HARD TO SIT STILL: 3
6. BECOMING EASILY ANNOYED OR IRRITABLE: 0
2. NOT BEING ABLE TO STOP OR CONTROL WORRYING: 3
4. TROUBLE RELAXING: 2
GAD7 TOTAL SCORE: 14
1. FEELING NERVOUS, ANXIOUS, OR ON EDGE: 3
3. WORRYING TOO MUCH ABOUT DIFFERENT THINGS: 3

## 2023-03-20 NOTE — PROGRESS NOTES
[facility]  3/20/2023    Edwar Hogue (:  2000) is a 25 y.o. female, here for evaluation of the following medical concerns:    Chief Complaint   Patient presents with    Medication Check     Not working         ASSESSMENT/ PLAN  1. Recurrent major depressive disorder, remission status unspecified (HCC)  Uncontrolled, has failed 2 SSRIs at this point. Continue Wellbutrin, initiate Effexor today. Referral placed for psychotherapy. May benefit from GeneSight testing.  - Ambulatory referral to Psychology  - venlafaxine (EFFEXOR XR) 37.5 MG extended release capsule; Take 1 capsule by mouth daily  Dispense: 30 capsule; Refill: 0    2. LOKI (generalized anxiety disorder)  Per above    Return in about 2 weeks (around 4/3/2023) for medication management, mood recheck, annual physical.    HPI  Patient presents today for follow-up on anxiety and depression. She notes that Wellbutrin has been helpful for her depression since its initiation, however adjunctive Prozac was unhelpful. She states that it caused stomach pain which resolved when she stopped taking the medication. She notes that she has been on another SSRI previously which did not help her mood. Denies recent life stressors. She has difficulty with sleep. Exercise includes rollerblading.  + Passive SI, no active SI, plan, HI or self-harm. ROS  Review of Systems   Constitutional:  Negative for activity change, appetite change, fatigue and unexpected weight change. Respiratory:  Negative for chest tightness and shortness of breath. Cardiovascular:  Negative for palpitations. Gastrointestinal:  Negative for abdominal pain, constipation and nausea. Neurological:  Negative for headaches. Psychiatric/Behavioral:  Positive for agitation, sleep disturbance and suicidal ideas. Negative for decreased concentration and self-injury. The patient is nervous/anxious.       HISTORIES  Current Outpatient Medications on File Prior to

## 2023-11-10 ENCOUNTER — OFFICE VISIT (OUTPATIENT)
Dept: PRIMARY CARE CLINIC | Age: 23
End: 2023-11-10
Payer: COMMERCIAL

## 2023-11-10 VITALS
HEART RATE: 89 BPM | DIASTOLIC BLOOD PRESSURE: 74 MMHG | HEIGHT: 65 IN | WEIGHT: 150 LBS | SYSTOLIC BLOOD PRESSURE: 133 MMHG | BODY MASS INDEX: 24.99 KG/M2

## 2023-11-10 DIAGNOSIS — F33.0 MILD EPISODE OF RECURRENT MAJOR DEPRESSIVE DISORDER (HCC): ICD-10-CM

## 2023-11-10 DIAGNOSIS — F41.1 GAD (GENERALIZED ANXIETY DISORDER): Primary | ICD-10-CM

## 2023-11-10 PROBLEM — F32.9 MAJOR DEPRESSIVE DISORDER, SINGLE EPISODE, UNSPECIFIED: Status: RESOLVED | Noted: 2020-07-20 | Resolved: 2023-11-10

## 2023-11-10 PROCEDURE — 96127 BRIEF EMOTIONAL/BEHAV ASSMT: CPT | Performed by: STUDENT IN AN ORGANIZED HEALTH CARE EDUCATION/TRAINING PROGRAM

## 2023-11-10 PROCEDURE — 1036F TOBACCO NON-USER: CPT | Performed by: STUDENT IN AN ORGANIZED HEALTH CARE EDUCATION/TRAINING PROGRAM

## 2023-11-10 PROCEDURE — G8420 CALC BMI NORM PARAMETERS: HCPCS | Performed by: STUDENT IN AN ORGANIZED HEALTH CARE EDUCATION/TRAINING PROGRAM

## 2023-11-10 PROCEDURE — G8427 DOCREV CUR MEDS BY ELIG CLIN: HCPCS | Performed by: STUDENT IN AN ORGANIZED HEALTH CARE EDUCATION/TRAINING PROGRAM

## 2023-11-10 PROCEDURE — G8484 FLU IMMUNIZE NO ADMIN: HCPCS | Performed by: STUDENT IN AN ORGANIZED HEALTH CARE EDUCATION/TRAINING PROGRAM

## 2023-11-10 PROCEDURE — 99214 OFFICE O/P EST MOD 30 MIN: CPT | Performed by: STUDENT IN AN ORGANIZED HEALTH CARE EDUCATION/TRAINING PROGRAM

## 2023-11-10 RX ORDER — SULFAMETHOXAZOLE AND TRIMETHOPRIM 800; 160 MG/1; MG/1
1 TABLET ORAL 2 TIMES DAILY
Qty: 10 TABLET | Refills: 0 | Status: SHIPPED | OUTPATIENT
Start: 2023-11-10 | End: 2023-11-15

## 2023-11-10 RX ORDER — BUPROPION HYDROCHLORIDE 150 MG/1
450 TABLET ORAL EVERY MORNING
Qty: 90 TABLET | Refills: 3 | Status: SHIPPED | OUTPATIENT
Start: 2023-11-10

## 2023-11-10 RX ORDER — DESVENLAFAXINE 25 MG/1
25 TABLET, EXTENDED RELEASE ORAL DAILY
Qty: 30 TABLET | Refills: 2 | Status: SHIPPED | OUTPATIENT
Start: 2023-11-10

## 2023-11-10 ASSESSMENT — ANXIETY QUESTIONNAIRES
1. FEELING NERVOUS, ANXIOUS, OR ON EDGE: 3
IF YOU CHECKED OFF ANY PROBLEMS ON THIS QUESTIONNAIRE, HOW DIFFICULT HAVE THESE PROBLEMS MADE IT FOR YOU TO DO YOUR WORK, TAKE CARE OF THINGS AT HOME, OR GET ALONG WITH OTHER PEOPLE: SOMEWHAT DIFFICULT
GAD7 TOTAL SCORE: 18
6. BECOMING EASILY ANNOYED OR IRRITABLE: 1
7. FEELING AFRAID AS IF SOMETHING AWFUL MIGHT HAPPEN: 3
4. TROUBLE RELAXING: 2
3. WORRYING TOO MUCH ABOUT DIFFERENT THINGS: 3
2. NOT BEING ABLE TO STOP OR CONTROL WORRYING: 3
5. BEING SO RESTLESS THAT IT IS HARD TO SIT STILL: 3

## 2023-11-10 ASSESSMENT — ENCOUNTER SYMPTOMS
VOMITING: 0
NAUSEA: 0

## 2024-03-07 ENCOUNTER — TELEMEDICINE (OUTPATIENT)
Dept: PRIMARY CARE CLINIC | Age: 24
End: 2024-03-07
Payer: COMMERCIAL

## 2024-03-07 DIAGNOSIS — F41.1 GAD (GENERALIZED ANXIETY DISORDER): ICD-10-CM

## 2024-03-07 DIAGNOSIS — F33.0 MILD EPISODE OF RECURRENT MAJOR DEPRESSIVE DISORDER (HCC): ICD-10-CM

## 2024-03-07 PROCEDURE — G8484 FLU IMMUNIZE NO ADMIN: HCPCS | Performed by: STUDENT IN AN ORGANIZED HEALTH CARE EDUCATION/TRAINING PROGRAM

## 2024-03-07 PROCEDURE — 99214 OFFICE O/P EST MOD 30 MIN: CPT | Performed by: STUDENT IN AN ORGANIZED HEALTH CARE EDUCATION/TRAINING PROGRAM

## 2024-03-07 PROCEDURE — G8420 CALC BMI NORM PARAMETERS: HCPCS | Performed by: STUDENT IN AN ORGANIZED HEALTH CARE EDUCATION/TRAINING PROGRAM

## 2024-03-07 PROCEDURE — G8427 DOCREV CUR MEDS BY ELIG CLIN: HCPCS | Performed by: STUDENT IN AN ORGANIZED HEALTH CARE EDUCATION/TRAINING PROGRAM

## 2024-03-07 PROCEDURE — 1036F TOBACCO NON-USER: CPT | Performed by: STUDENT IN AN ORGANIZED HEALTH CARE EDUCATION/TRAINING PROGRAM

## 2024-03-07 RX ORDER — DESVENLAFAXINE SUCCINATE 50 MG/1
50 TABLET, EXTENDED RELEASE ORAL DAILY
Qty: 90 TABLET | Refills: 1 | Status: SHIPPED | OUTPATIENT
Start: 2024-03-07

## 2024-03-07 RX ORDER — BUPROPION HYDROCHLORIDE 150 MG/1
450 TABLET ORAL EVERY MORNING
Qty: 90 TABLET | Refills: 3 | Status: SHIPPED | OUTPATIENT
Start: 2024-03-07

## 2024-03-07 RX ORDER — DESVENLAFAXINE 25 MG/1
25 TABLET, EXTENDED RELEASE ORAL DAILY
Qty: 30 TABLET | Refills: 2 | Status: CANCELLED | OUTPATIENT
Start: 2024-03-07

## 2024-03-07 ASSESSMENT — PATIENT HEALTH QUESTIONNAIRE - PHQ9
8. MOVING OR SPEAKING SO SLOWLY THAT OTHER PEOPLE COULD HAVE NOTICED. OR THE OPPOSITE, BEING SO FIGETY OR RESTLESS THAT YOU HAVE BEEN MOVING AROUND A LOT MORE THAN USUAL: 0
3. TROUBLE FALLING OR STAYING ASLEEP: 3
SUM OF ALL RESPONSES TO PHQ QUESTIONS 1-9: 11
2. FEELING DOWN, DEPRESSED OR HOPELESS: 2
4. FEELING TIRED OR HAVING LITTLE ENERGY: 3
7. TROUBLE CONCENTRATING ON THINGS, SUCH AS READING THE NEWSPAPER OR WATCHING TELEVISION: 0
SUM OF ALL RESPONSES TO PHQ QUESTIONS 1-9: 11
6. FEELING BAD ABOUT YOURSELF - OR THAT YOU ARE A FAILURE OR HAVE LET YOURSELF OR YOUR FAMILY DOWN: 1
1. LITTLE INTEREST OR PLEASURE IN DOING THINGS: 1
9. THOUGHTS THAT YOU WOULD BE BETTER OFF DEAD, OR OF HURTING YOURSELF: 0
5. POOR APPETITE OR OVEREATING: 1
SUM OF ALL RESPONSES TO PHQ QUESTIONS 1-9: 11
10. IF YOU CHECKED OFF ANY PROBLEMS, HOW DIFFICULT HAVE THESE PROBLEMS MADE IT FOR YOU TO DO YOUR WORK, TAKE CARE OF THINGS AT HOME, OR GET ALONG WITH OTHER PEOPLE: 1
SUM OF ALL RESPONSES TO PHQ9 QUESTIONS 1 & 2: 3
SUM OF ALL RESPONSES TO PHQ QUESTIONS 1-9: 11

## 2024-03-07 ASSESSMENT — ANXIETY QUESTIONNAIRES
4. TROUBLE RELAXING: 1
2. NOT BEING ABLE TO STOP OR CONTROL WORRYING: 2
IF YOU CHECKED OFF ANY PROBLEMS ON THIS QUESTIONNAIRE, HOW DIFFICULT HAVE THESE PROBLEMS MADE IT FOR YOU TO DO YOUR WORK, TAKE CARE OF THINGS AT HOME, OR GET ALONG WITH OTHER PEOPLE: EXTREMELY DIFFICULT
1. FEELING NERVOUS, ANXIOUS, OR ON EDGE: 1
5. BEING SO RESTLESS THAT IT IS HARD TO SIT STILL: 3
7. FEELING AFRAID AS IF SOMETHING AWFUL MIGHT HAPPEN: 3
GAD7 TOTAL SCORE: 12
6. BECOMING EASILY ANNOYED OR IRRITABLE: 0
3. WORRYING TOO MUCH ABOUT DIFFERENT THINGS: 2

## 2024-03-07 ASSESSMENT — ENCOUNTER SYMPTOMS
VOMITING: 0
NAUSEA: 0

## 2024-03-07 NOTE — PROGRESS NOTES
3/7/2024       TELEHEALTH EVALUATION -- Audio/Visual (During COVID-19 public health emergency)    HPI:    Jessica James (:  2000) has requested an audio/video evaluation for the following concern(s):    Mood Disorder:  Patient presents for follow-up of depression and anxiety disorder. Current complaints include: irritability, excessive worry, and restlessness. Overall significantly improved.  She denies any other symptoms. Symptoms/signs of radha: none.  External stressors: nothing new. Current treatment includes: Pristiq- 25 and Wellbutrin- 450.  Medication side effects: none.      Review of Systems   Constitutional:  Negative for activity change, fatigue and unexpected weight change.   Gastrointestinal:  Negative for nausea and vomiting.   Endocrine: Negative for cold intolerance and heat intolerance.   Skin:  Negative for rash.   Neurological:  Negative for dizziness, seizures and light-headedness.   Psychiatric/Behavioral:  Negative for agitation, decreased concentration, dysphoric mood, self-injury, sleep disturbance and suicidal ideas. The patient is not nervous/anxious.        Prior to Visit Medications    Medication Sig Taking? Authorizing Provider   buPROPion (WELLBUTRIN XL) 150 MG extended release tablet Take 3 tablets by mouth every morning Yes Angus Prado DO   desvenlafaxine succinate (PRISTIQ) 50 MG TB24 extended release tablet Take 1 tablet by mouth daily Yes Angus Prado DO       Social History     Tobacco Use    Smoking status: Never    Smokeless tobacco: Never   Vaping Use    Vaping Use: Never used   Substance Use Topics    Alcohol use: Not Currently     Comment: soc    Drug use: Never        Past Medical History:   Diagnosis Date    Depression        PHYSICAL EXAMINATION:  There were no vitals taken for this visit.  Wt Readings from Last 3 Encounters:   11/10/23 68 kg (150 lb)   23 67.1 kg (148 lb)   23 63.9 kg (140 lb 12.8 oz)       [ INSTRUCTIONS:  \"[x]\" Indicates a

## 2024-08-20 DIAGNOSIS — F33.0 MILD EPISODE OF RECURRENT MAJOR DEPRESSIVE DISORDER (HCC): ICD-10-CM

## 2024-08-21 RX ORDER — BUPROPION HYDROCHLORIDE 150 MG/1
450 TABLET ORAL EVERY MORNING
Qty: 90 TABLET | Refills: 3 | Status: SHIPPED | OUTPATIENT
Start: 2024-08-21

## 2024-08-21 NOTE — TELEPHONE ENCOUNTER
Medication:   Requested Prescriptions     Pending Prescriptions Disp Refills    buPROPion (WELLBUTRIN XL) 150 MG extended release tablet 90 tablet 3     Sig: Take 3 tablets by mouth every morning        Last Filled:  3/7/24    Patient Phone Number: [phone] (home)     Last appt: 3/7/2024 Return in about 6 months (around 9/7/2024) for Depression/Anxiety.   Next appt: Visit date not found    Last OARRS:        No data to display

## 2024-12-06 DIAGNOSIS — F41.1 GAD (GENERALIZED ANXIETY DISORDER): ICD-10-CM

## 2024-12-06 NOTE — TELEPHONE ENCOUNTER
Medication:   Requested Prescriptions     Pending Prescriptions Disp Refills    desvenlafaxine succinate (PRISTIQ) 50 MG TB24 extended release tablet 90 tablet 1     Sig: Take 1 tablet by mouth daily        Last Filled:  3/7/24    Patient Phone Number: [phone] (home)     Last appt: 3/7/2024  Return in about 6 months (around 9/7/2024) for Depression/Anxiety.  Next appt: Visit date not found    Last OARRS:        No data to display

## 2024-12-09 RX ORDER — DESVENLAFAXINE 50 MG/1
50 TABLET, FILM COATED, EXTENDED RELEASE ORAL DAILY
Qty: 90 TABLET | Refills: 0 | Status: SHIPPED | OUTPATIENT
Start: 2024-12-09

## 2025-03-03 ENCOUNTER — OFFICE VISIT (OUTPATIENT)
Dept: PRIMARY CARE CLINIC | Age: 25
End: 2025-03-03
Payer: COMMERCIAL

## 2025-03-03 VITALS
HEART RATE: 76 BPM | TEMPERATURE: 97.2 F | SYSTOLIC BLOOD PRESSURE: 129 MMHG | HEIGHT: 64 IN | BODY MASS INDEX: 27.83 KG/M2 | DIASTOLIC BLOOD PRESSURE: 77 MMHG | WEIGHT: 163 LBS

## 2025-03-03 DIAGNOSIS — F33.0 MILD EPISODE OF RECURRENT MAJOR DEPRESSIVE DISORDER: Primary | ICD-10-CM

## 2025-03-03 DIAGNOSIS — F41.1 GAD (GENERALIZED ANXIETY DISORDER): ICD-10-CM

## 2025-03-03 PROCEDURE — G8419 CALC BMI OUT NRM PARAM NOF/U: HCPCS | Performed by: STUDENT IN AN ORGANIZED HEALTH CARE EDUCATION/TRAINING PROGRAM

## 2025-03-03 PROCEDURE — 99214 OFFICE O/P EST MOD 30 MIN: CPT | Performed by: STUDENT IN AN ORGANIZED HEALTH CARE EDUCATION/TRAINING PROGRAM

## 2025-03-03 PROCEDURE — G2211 COMPLEX E/M VISIT ADD ON: HCPCS | Performed by: STUDENT IN AN ORGANIZED HEALTH CARE EDUCATION/TRAINING PROGRAM

## 2025-03-03 PROCEDURE — 1036F TOBACCO NON-USER: CPT | Performed by: STUDENT IN AN ORGANIZED HEALTH CARE EDUCATION/TRAINING PROGRAM

## 2025-03-03 PROCEDURE — G8427 DOCREV CUR MEDS BY ELIG CLIN: HCPCS | Performed by: STUDENT IN AN ORGANIZED HEALTH CARE EDUCATION/TRAINING PROGRAM

## 2025-03-03 RX ORDER — BUSPIRONE HYDROCHLORIDE 5 MG/1
5 TABLET ORAL 2 TIMES DAILY
Qty: 60 TABLET | Refills: 1 | Status: SHIPPED | OUTPATIENT
Start: 2025-03-03 | End: 2025-05-02

## 2025-03-03 SDOH — ECONOMIC STABILITY: FOOD INSECURITY: WITHIN THE PAST 12 MONTHS, THE FOOD YOU BOUGHT JUST DIDN'T LAST AND YOU DIDN'T HAVE MONEY TO GET MORE.: NEVER TRUE

## 2025-03-03 SDOH — ECONOMIC STABILITY: FOOD INSECURITY: WITHIN THE PAST 12 MONTHS, YOU WORRIED THAT YOUR FOOD WOULD RUN OUT BEFORE YOU GOT MONEY TO BUY MORE.: NEVER TRUE

## 2025-03-03 ASSESSMENT — PATIENT HEALTH QUESTIONNAIRE - PHQ9
10. IF YOU CHECKED OFF ANY PROBLEMS, HOW DIFFICULT HAVE THESE PROBLEMS MADE IT FOR YOU TO DO YOUR WORK, TAKE CARE OF THINGS AT HOME, OR GET ALONG WITH OTHER PEOPLE: VERY DIFFICULT
8. MOVING OR SPEAKING SO SLOWLY THAT OTHER PEOPLE COULD HAVE NOTICED. OR THE OPPOSITE - BEING SO FIDGETY OR RESTLESS THAT YOU HAVE BEEN MOVING AROUND A LOT MORE THAN USUAL: NEARLY EVERY DAY
4. FEELING TIRED OR HAVING LITTLE ENERGY: NEARLY EVERY DAY
1. LITTLE INTEREST OR PLEASURE IN DOING THINGS: SEVERAL DAYS
9. THOUGHTS THAT YOU WOULD BE BETTER OFF DEAD, OR OF HURTING YOURSELF: NOT AT ALL
2. FEELING DOWN, DEPRESSED OR HOPELESS: SEVERAL DAYS
9. THOUGHTS THAT YOU WOULD BE BETTER OFF DEAD, OR OF HURTING YOURSELF: NOT AT ALL
7. TROUBLE CONCENTRATING ON THINGS, SUCH AS READING THE NEWSPAPER OR WATCHING TELEVISION: MORE THAN HALF THE DAYS
3. TROUBLE FALLING OR STAYING ASLEEP: NEARLY EVERY DAY
SUM OF ALL RESPONSES TO PHQ QUESTIONS 1-9: 19
5. POOR APPETITE OR OVEREATING: NEARLY EVERY DAY
7. TROUBLE CONCENTRATING ON THINGS, SUCH AS READING THE NEWSPAPER OR WATCHING TELEVISION: MORE THAN HALF THE DAYS
SUM OF ALL RESPONSES TO PHQ QUESTIONS 1-9: 19
SUM OF ALL RESPONSES TO PHQ QUESTIONS 1-9: 19
8. MOVING OR SPEAKING SO SLOWLY THAT OTHER PEOPLE COULD HAVE NOTICED. OR THE OPPOSITE, BEING SO FIGETY OR RESTLESS THAT YOU HAVE BEEN MOVING AROUND A LOT MORE THAN USUAL: NEARLY EVERY DAY
3. TROUBLE FALLING OR STAYING ASLEEP: NEARLY EVERY DAY
SUM OF ALL RESPONSES TO PHQ QUESTIONS 1-9: 19
4. FEELING TIRED OR HAVING LITTLE ENERGY: NEARLY EVERY DAY
6. FEELING BAD ABOUT YOURSELF - OR THAT YOU ARE A FAILURE OR HAVE LET YOURSELF OR YOUR FAMILY DOWN: NEARLY EVERY DAY
6. FEELING BAD ABOUT YOURSELF - OR THAT YOU ARE A FAILURE OR HAVE LET YOURSELF OR YOUR FAMILY DOWN: NEARLY EVERY DAY
5. POOR APPETITE OR OVEREATING: NEARLY EVERY DAY
SUM OF ALL RESPONSES TO PHQ QUESTIONS 1-9: 19
2. FEELING DOWN, DEPRESSED OR HOPELESS: SEVERAL DAYS
1. LITTLE INTEREST OR PLEASURE IN DOING THINGS: SEVERAL DAYS
10. IF YOU CHECKED OFF ANY PROBLEMS, HOW DIFFICULT HAVE THESE PROBLEMS MADE IT FOR YOU TO DO YOUR WORK, TAKE CARE OF THINGS AT HOME, OR GET ALONG WITH OTHER PEOPLE: VERY DIFFICULT

## 2025-03-03 ASSESSMENT — ENCOUNTER SYMPTOMS
NAUSEA: 0
VOMITING: 0

## 2025-03-03 ASSESSMENT — ANXIETY QUESTIONNAIRES
GAD7 TOTAL SCORE: 11
6. BECOMING EASILY ANNOYED OR IRRITABLE: SEVERAL DAYS
4. TROUBLE RELAXING: SEVERAL DAYS
7. FEELING AFRAID AS IF SOMETHING AWFUL MIGHT HAPPEN: SEVERAL DAYS
3. WORRYING TOO MUCH ABOUT DIFFERENT THINGS: MORE THAN HALF THE DAYS
IF YOU CHECKED OFF ANY PROBLEMS ON THIS QUESTIONNAIRE, HOW DIFFICULT HAVE THESE PROBLEMS MADE IT FOR YOU TO DO YOUR WORK, TAKE CARE OF THINGS AT HOME, OR GET ALONG WITH OTHER PEOPLE: VERY DIFFICULT
2. NOT BEING ABLE TO STOP OR CONTROL WORRYING: MORE THAN HALF THE DAYS
5. BEING SO RESTLESS THAT IT IS HARD TO SIT STILL: NEARLY EVERY DAY
1. FEELING NERVOUS, ANXIOUS, OR ON EDGE: SEVERAL DAYS

## 2025-03-03 NOTE — PATIENT INSTRUCTIONS
Faith activity or other social gathering. Go to a [employer] game.  Ask a friend to have dinner with you.  Take care of yourself  Eat a balanced diet with plenty of fresh fruits and vegetables, whole grains, and lean protein. If you have lost your appetite, eat small snacks rather than large meals.  Avoid using illegal drugs or marijuana and drinking alcohol. Do not take medicines that have not been prescribed for you. They may interfere with medicines you may be taking for depression, or they may make your depression worse.  Take your medicines exactly as they are prescribed. You may start to feel better within 1 to 3 weeks of taking antidepressant medicine. But it can take as many as 6 to 8 weeks to see more improvement. If you have questions or concerns about your medicines, or if you do not notice any improvement by 3 weeks, talk to your doctor.  Continue to take your medicine after your symptoms improve. Taking your medicine for at least 6 months after you feel better can help keep you from getting depressed again. If this isn't the first time you have been depressed, your doctor may recommend you to take medicine even longer.  If you have any side effects from your medicine, tell your doctor. Many side effects are mild and will go away on their own after you have been taking the medicine for a few weeks. Some may last longer. Talk to your doctor if side effects are bothering you too much. You might be able to try a different medicine.  Continue counseling. It may help prevent depression from returning, especially if you've had multiple episodes of depression. Talk with your counselor if you are having a hard time attending your sessions or you think the sessions aren't working. Don't just stop going.  Get enough sleep. Talk to your doctor if you are having problems sleeping.  Avoid sleeping pills unless they are prescribed by the doctor treating your depression. Sleeping pills may make you groggy during the day,

## 2025-03-03 NOTE — PROGRESS NOTES
lb).    Physical Exam  Vitals reviewed.   Constitutional:       Appearance: Normal appearance.   HENT:      Head: Normocephalic and atraumatic.      Nose: Nose normal.      Mouth/Throat:      Pharynx: Oropharynx is clear.   Eyes:      Conjunctiva/sclera: Conjunctivae normal.   Cardiovascular:      Rate and Rhythm: Normal rate and regular rhythm.   Pulmonary:      Effort: Pulmonary effort is normal.   Skin:     General: Skin is warm and dry.      Capillary Refill: Capillary refill takes less than 2 seconds.   Neurological:      Mental Status: She is alert. Mental status is at baseline.   Psychiatric:         Mood and Affect: Mood is anxious and depressed. Affect is flat.         Behavior: Behavior normal.         Thought Content: Thought content normal.         Judgment: Judgment normal.         ASSESSMENT/PLAN:  1. Mild episode of recurrent major depressive disorder  Main complaint today is anxiety, but longstanding history of poor response to SSRIs.  Will start BuSpar today.  Given the severity of her anxiety I am doubtful this alone is going to be adequate, so we will GeneSight test her today.  Follow-up in 2 weeks to review the results and select new treatment.    2. LOKI (generalized anxiety disorder)  As above  - busPIRone (BUSPAR) 5 MG tablet; Take 1 tablet by mouth 2 times daily  Dispense: 60 tablet; Refill: 1      Return in about 4 weeks (around 3/31/2025) for Depression/Anxiety.    An electronic signature was used to authenticate this note.    --Angus Prado,  on 3/3/2025 at 3:32 PM

## 2025-03-21 DIAGNOSIS — F33.0 MILD EPISODE OF RECURRENT MAJOR DEPRESSIVE DISORDER: ICD-10-CM

## 2025-03-24 NOTE — TELEPHONE ENCOUNTER
Medication:   Requested Prescriptions     Pending Prescriptions Disp Refills    buPROPion (WELLBUTRIN XL) 150 MG extended release tablet 90 tablet 3     Sig: Take 3 tablets by mouth every morning        Last Filled:  8/21/2024    Patient Phone Number: [phone] (home)     Last appt: 3/3/2025   Next appt: 3/31/2025             Return in about 4 weeks (around 3/31/2025) for Depression/Anxiety.

## 2025-03-25 RX ORDER — BUPROPION HYDROCHLORIDE 150 MG/1
450 TABLET ORAL EVERY MORNING
Qty: 90 TABLET | Refills: 3 | Status: SHIPPED | OUTPATIENT
Start: 2025-03-25

## 2025-04-01 ENCOUNTER — TELEMEDICINE (OUTPATIENT)
Dept: PRIMARY CARE CLINIC | Age: 25
End: 2025-04-01
Payer: COMMERCIAL

## 2025-04-01 DIAGNOSIS — F41.1 GAD (GENERALIZED ANXIETY DISORDER): ICD-10-CM

## 2025-04-01 DIAGNOSIS — F33.0 MILD EPISODE OF RECURRENT MAJOR DEPRESSIVE DISORDER: Primary | ICD-10-CM

## 2025-04-01 PROCEDURE — G8419 CALC BMI OUT NRM PARAM NOF/U: HCPCS | Performed by: STUDENT IN AN ORGANIZED HEALTH CARE EDUCATION/TRAINING PROGRAM

## 2025-04-01 PROCEDURE — 99214 OFFICE O/P EST MOD 30 MIN: CPT | Performed by: STUDENT IN AN ORGANIZED HEALTH CARE EDUCATION/TRAINING PROGRAM

## 2025-04-01 PROCEDURE — G8427 DOCREV CUR MEDS BY ELIG CLIN: HCPCS | Performed by: STUDENT IN AN ORGANIZED HEALTH CARE EDUCATION/TRAINING PROGRAM

## 2025-04-01 PROCEDURE — 1036F TOBACCO NON-USER: CPT | Performed by: STUDENT IN AN ORGANIZED HEALTH CARE EDUCATION/TRAINING PROGRAM

## 2025-04-01 RX ORDER — BUSPIRONE HYDROCHLORIDE 7.5 MG/1
7.5 TABLET ORAL 2 TIMES DAILY
Qty: 60 TABLET | Refills: 2 | Status: SHIPPED | OUTPATIENT
Start: 2025-04-01 | End: 2025-05-01

## 2025-04-01 ASSESSMENT — ENCOUNTER SYMPTOMS
VOMITING: 0
NAUSEA: 0

## 2025-04-01 NOTE — PROGRESS NOTES
2025       TELEHEALTH EVALUATION -- Audio/Visual (During COVID-19 public health emergency)    HPI:    Jessica James (:  2000) has requested an audio/video evaluation for the following concern(s):    Mood Disorder:  Patient presents for follow-up of depression and anxiety disorder. Current complaints include: excessive worry.  She denies any other symptoms. Symptoms/signs of radha: none.  External stressors: nothing new. Current treatment includes: Wellbutrin- 450 and Buspar.  Medication side effects: none.      Review of Systems   Constitutional:  Negative for activity change, fatigue and unexpected weight change.   Gastrointestinal:  Negative for nausea and vomiting.   Endocrine: Negative for cold intolerance and heat intolerance.   Skin:  Negative for rash.   Neurological:  Negative for dizziness, seizures and light-headedness.   Psychiatric/Behavioral:  Negative for agitation, decreased concentration, dysphoric mood, self-injury, sleep disturbance and suicidal ideas. The patient is nervous/anxious.        Prior to Visit Medications    Medication Sig Taking? Authorizing Provider   busPIRone (BUSPAR) 7.5 MG tablet Take 1 tablet by mouth 2 times daily Yes Angus Prado DO   buPROPion (WELLBUTRIN XL) 150 MG extended release tablet Take 3 tablets by mouth every morning Yes Angus Prado DO       Social History     Tobacco Use    Smoking status: Never    Smokeless tobacco: Never   Vaping Use    Vaping status: Never Used   Substance Use Topics    Alcohol use: Not Currently     Comment: soc    Drug use: Never        Past Medical History:   Diagnosis Date    Depression        PHYSICAL EXAMINATION:  There were no vitals taken for this visit.  Wt Readings from Last 3 Encounters:   25 73.9 kg (163 lb)   11/10/23 68 kg (150 lb)   23 67.1 kg (148 lb)       [ INSTRUCTIONS:  \"[x]\" Indicates a positive item  \"[]\" Indicates a negative item  -- DELETE ALL ITEMS NOT EXAMINED]  [x] Alert  [x] Oriented to

## 2025-04-28 ENCOUNTER — OFFICE VISIT (OUTPATIENT)
Dept: URGENT CARE | Age: 25
End: 2025-04-28

## 2025-04-28 VITALS
BODY MASS INDEX: 25.61 KG/M2 | SYSTOLIC BLOOD PRESSURE: 129 MMHG | WEIGHT: 150 LBS | HEIGHT: 64 IN | DIASTOLIC BLOOD PRESSURE: 86 MMHG | OXYGEN SATURATION: 99 % | HEART RATE: 79 BPM

## 2025-04-28 DIAGNOSIS — S81.811A LACERATION OF RIGHT LOWER EXTREMITY, INITIAL ENCOUNTER: Primary | ICD-10-CM

## 2025-04-28 RX ORDER — CEPHALEXIN 500 MG/1
500 CAPSULE ORAL 3 TIMES DAILY
Qty: 30 CAPSULE | Refills: 0 | Status: SHIPPED | OUTPATIENT
Start: 2025-04-28 | End: 2025-05-08

## 2025-04-28 RX ORDER — ETONOGESTREL AND ETHINYL ESTRADIOL VAGINAL RING .015; .12 MG/D; MG/D
RING VAGINAL
COMMUNITY
Start: 2025-04-22

## 2025-04-28 NOTE — PROGRESS NOTES
Jessica James (:  2000) is a 24 y.o. female,New patient, here for evaluation of the following chief complaint(s):  Body Laceration (Left leg cutt on knife, inside of knee. )      ASSESSMENT/PLAN:    ICD-10-CM    1. Laceration of right lower extremity, initial encounter  S81.811A cephALEXin (KEFLEX) 500 MG capsule        Patient wants to first check with her PCP regarding her tetanus status and not let us administer booster at this time    Patient was stepping over open  in which the knife was pointed blade up handle down and she got stabbed  There is a diagonal laceration approximately 6 cm in length --depth subcu cutaneous/fat tissue note no other deep structures visible.  No active bleeding mild oozing anesthetic achieved with 1% lidocaine plain wound was vigorously irrigated with sterile saline and explored-no foreign body noted/no deep structures visible  Laceration is along the medial side of upper right leg and away from/distal to knee joint     Procedure explained ----risks (inadequate anesthesia/numbness, bleeding,  retained foreign body, residual pain/numbness, infection, limited function/movement, scar/deformity)......vs benefit (able to explore/assess severity of wound, wound closure, promote healing, decrease risk infection )  Consent received    Procedure Note:  Anesthesia with 7-10 mL of 1% Lidocaine without Epinephrine.   Once anesthesia of the site was confirmed, wound was cleansed, debrided of visible foreign material and necrotic tissue.   Wound was sutured (5 simple, interrupted sutures of 3 -0 Prolene).   Pressure dressing applied.  Wound care instructions provided.    Discussed watching for any signs of infection or other problems.    Discussed return for suture removal in 12 days.     Keep wound clean and dry-   Ibuprofen/tylenol (if no contraindications) prn pain  Can remove dressing tomorrow and leave open to air at home- can cover if in a dirty environment  Return

## 2025-04-28 NOTE — PATIENT INSTRUCTIONS
Keep wound clean and dry-   Ibuprofen/tylenol (if no contraindications) prn pain  Can remove dressing tomorrow and leave open to air at home- can cover if in a dirty environment  Return  anytime if increase in local pain, increase redness or red streak forming or if increase swelling or drainage noted  Take antibiotic as prescrribed prescribed  Return for suture/staple removal in 12 days

## 2025-05-13 ENCOUNTER — OFFICE VISIT (OUTPATIENT)
Dept: URGENT CARE | Age: 25
End: 2025-05-13

## 2025-05-13 VITALS
BODY MASS INDEX: 25.61 KG/M2 | DIASTOLIC BLOOD PRESSURE: 73 MMHG | SYSTOLIC BLOOD PRESSURE: 111 MMHG | WEIGHT: 150 LBS | HEART RATE: 70 BPM | OXYGEN SATURATION: 100 % | HEIGHT: 64 IN | TEMPERATURE: 98.8 F

## 2025-05-13 DIAGNOSIS — Z48.02 VISIT FOR SUTURE REMOVAL: Primary | ICD-10-CM

## 2025-05-13 ASSESSMENT — ENCOUNTER SYMPTOMS
VOMITING: 0
SHORTNESS OF BREATH: 0
ABDOMINAL PAIN: 0
DIARRHEA: 0
NAUSEA: 0
EYE PAIN: 0

## 2025-05-13 NOTE — PROGRESS NOTES
Jessica James (: 2000) is a 24 y.o. female, Established patient, here for evaluation of the following chief complaint(s):  Suture / Staple Removal      ASSESSMENT/PLAN:    ICD-10-CM    1. Visit for suture removal  Z48.02 SUTURE REMOVAL          - Low concern for cellulitis, compartment syndrome, neurovascular compromise or sepsis.  - Pt to drink lots of fluids  - Pt ok to take tylenol and ibuprofen as needed  - Pt told to clean area with soap and water and place neosporin and bandage once a day for 7 days  - Pt to monitor for signs and symptoms of infection such as redness, swelling, more pain, bleeding or drainage  - Pt to call if any symptoms worsen or follow up with PCP if not better in 7 days  - Pt to go to ER if have shortness of breath, chest pain, sudden fever or lethargy    Discussed PCP follow up for persisting or worsening symptoms, or to return to the clinic if unable to obtain PCP follow up for worsening symptoms.    The patient tolerated their visit well.      SUBJECTIVE/OBJECTIVE:  HPI:   24 y.o. female presents alone for complaint of pt states she was seen here in the clinic 13 days ago to have sutures placed on her right leg. Pt states the area is healing well. Pt states she cut her right leg with a knife.     Denies redness, swelling, tenderness, bleeding or drainage.     Has been covering the area. Pt is up to date on her tetanus.     Patient provided the HPI by themself - the patient is considered to be a reliable historian.        History provided by:  Patient   used: No      VITAL SIGNS  Vitals:    25 1103   BP: 111/73   Pulse: 70   Temp: 98.8 °F (37.1 °C)   TempSrc: Temporal   SpO2: 100%   Weight: 68 kg (150 lb)   Height: 1.626 m (5' 4\")       Review of Systems   Constitutional:  Negative for chills, fatigue and fever.   Eyes:  Negative for pain and visual disturbance.   Respiratory:  Negative for shortness of breath.    Cardiovascular:  Negative for

## 2025-07-01 ENCOUNTER — E-VISIT (OUTPATIENT)
Dept: PRIMARY CARE CLINIC | Age: 25
End: 2025-07-01
Payer: COMMERCIAL

## 2025-07-01 DIAGNOSIS — R30.0 DYSURIA: Primary | ICD-10-CM

## 2025-07-01 PROCEDURE — 99422 OL DIG E/M SVC 11-20 MIN: CPT | Performed by: NURSE PRACTITIONER

## 2025-07-01 RX ORDER — NITROFURANTOIN 25; 75 MG/1; MG/1
100 CAPSULE ORAL 2 TIMES DAILY
Qty: 10 CAPSULE | Refills: 0 | Status: SHIPPED | OUTPATIENT
Start: 2025-07-01 | End: 2025-07-06

## 2025-07-01 NOTE — PROGRESS NOTES
Jessica James (2000) initiated an asynchronous digital communication through [employer].    HPI: per patient questionnaire     Exam: not applicable    Diagnoses and all orders for this visit:  Diagnoses and all orders for this visit:    Dysuria    Other orders  -     nitrofurantoin, macrocrystal-monohydrate, (MACROBID) 100 MG capsule; Take 1 capsule by mouth 2 times daily for 5 days      Antibiotic sent. Supportive care. Fu with pcp as needed    16 minutes were spent on the digital evaluation and management of this patient.    SHERRY Potts - CNP

## 2025-07-15 DIAGNOSIS — F33.0 MILD EPISODE OF RECURRENT MAJOR DEPRESSIVE DISORDER: ICD-10-CM

## 2025-07-16 RX ORDER — BUPROPION HYDROCHLORIDE 150 MG/1
450 TABLET ORAL EVERY MORNING
Qty: 90 TABLET | Refills: 3 | Status: SHIPPED | OUTPATIENT
Start: 2025-07-16

## 2025-07-16 NOTE — TELEPHONE ENCOUNTER
Medication:   Requested Prescriptions     Pending Prescriptions Disp Refills    buPROPion (WELLBUTRIN XL) 150 MG extended release tablet 90 tablet 3     Sig: Take 3 tablets by mouth every morning        Last Filled:    3/25/25  Patient Phone Number: [phone] (home)     Last appt: 4/1/2025 Return in about 4 months (around 8/1/2025) for Depression/Anxiety.  Next appt: Visit date not found    Last OARRS:        No data to display

## 2025-07-20 DIAGNOSIS — F41.1 GAD (GENERALIZED ANXIETY DISORDER): ICD-10-CM

## 2025-07-21 RX ORDER — BUSPIRONE HYDROCHLORIDE 7.5 MG/1
7.5 TABLET ORAL 2 TIMES DAILY
Qty: 60 TABLET | Refills: 1 | Status: SHIPPED | OUTPATIENT
Start: 2025-07-21

## 2025-07-21 NOTE — TELEPHONE ENCOUNTER
Medication:   Requested Prescriptions     Pending Prescriptions Disp Refills    busPIRone (BUSPAR) 7.5 MG tablet [Pharmacy Med Name: busPIRone HCL 7.5 MG TABLET] 60 tablet 2     Sig: TAKE 1 TABLET BY MOUTH 2 TIMES A DAY        Last Filled:  05/01/25    Patient Phone Number: [phone] (home)     Last appt: 4/1/2025   Return in about 4 months (around 8/1/2025) for Depression/Anxiety.  Next appt: Visit date not found    Last OARRS:        No data to display